# Patient Record
Sex: FEMALE | Race: BLACK OR AFRICAN AMERICAN | NOT HISPANIC OR LATINO | URBAN - METROPOLITAN AREA
[De-identification: names, ages, dates, MRNs, and addresses within clinical notes are randomized per-mention and may not be internally consistent; named-entity substitution may affect disease eponyms.]

---

## 2020-10-27 ENCOUNTER — OFFICE VISIT (OUTPATIENT)
Dept: URBAN - METROPOLITAN AREA SURGERY CENTER 18 | Facility: SURGERY CENTER | Age: 65
End: 2020-10-27
Payer: COMMERCIAL

## 2020-10-27 ENCOUNTER — CLAIMS CREATED FROM THE CLAIM WINDOW (OUTPATIENT)
Dept: URBAN - METROPOLITAN AREA CLINIC 4 | Facility: CLINIC | Age: 65
End: 2020-10-27
Payer: COMMERCIAL

## 2020-10-27 DIAGNOSIS — D12.3 ADENOMA OF TRANSVERSE COLON: ICD-10-CM

## 2020-10-27 DIAGNOSIS — Z86.010 H/O ADENOMATOUS POLYP OF COLON: ICD-10-CM

## 2020-10-27 DIAGNOSIS — D12.4 BENIGN NEOPLASM OF DESCENDING COLON: ICD-10-CM

## 2020-10-27 DIAGNOSIS — D12.3 BENIGN NEOPLASM OF TRANSVERSE COLON: ICD-10-CM

## 2020-10-27 DIAGNOSIS — K63.89 BACTERIAL OVERGROWTH SYNDROME: ICD-10-CM

## 2020-10-27 DIAGNOSIS — D12.4 ADENOMA OF DESCENDING COLON: ICD-10-CM

## 2020-10-27 PROCEDURE — 45380 COLONOSCOPY AND BIOPSY: CPT | Performed by: INTERNAL MEDICINE

## 2020-10-27 PROCEDURE — 88305 TISSUE EXAM BY PATHOLOGIST: CPT | Performed by: PATHOLOGY

## 2020-10-27 PROCEDURE — G9936 PMH PLYP/NEO CO/RECT/JUN/ANS: HCPCS | Performed by: INTERNAL MEDICINE

## 2020-10-27 PROCEDURE — 45385 COLONOSCOPY W/LESION REMOVAL: CPT | Performed by: INTERNAL MEDICINE

## 2020-10-27 PROCEDURE — G8907 PT DOC NO EVENTS ON DISCHARG: HCPCS | Performed by: INTERNAL MEDICINE

## 2020-10-27 RX ORDER — ERGOCALCIFEROL 1.25 MG/1
CAPSULE ORAL
Qty: 0 | Refills: 0 | Status: ACTIVE | COMMUNITY
Start: 1900-01-01 | End: 1900-01-01

## 2022-12-22 ENCOUNTER — OFFICE VISIT (OUTPATIENT)
Dept: URBAN - METROPOLITAN AREA CLINIC 98 | Facility: CLINIC | Age: 67
End: 2022-12-22
Payer: COMMERCIAL

## 2022-12-22 ENCOUNTER — LAB OUTSIDE AN ENCOUNTER (OUTPATIENT)
Dept: URBAN - METROPOLITAN AREA CLINIC 98 | Facility: CLINIC | Age: 67
End: 2022-12-22

## 2022-12-22 VITALS
SYSTOLIC BLOOD PRESSURE: 146 MMHG | WEIGHT: 219.6 LBS | TEMPERATURE: 97.8 F | BODY MASS INDEX: 38.91 KG/M2 | HEIGHT: 63 IN | HEART RATE: 87 BPM | DIASTOLIC BLOOD PRESSURE: 91 MMHG

## 2022-12-22 DIAGNOSIS — Z86.010 HISTORY OF COLONIC POLYPS: ICD-10-CM

## 2022-12-22 DIAGNOSIS — K59.00 CONSTIPATION, UNSPECIFIED CONSTIPATION TYPE: ICD-10-CM

## 2022-12-22 DIAGNOSIS — K57.90 DIVERTICULOSIS: ICD-10-CM

## 2022-12-22 PROCEDURE — 99213 OFFICE O/P EST LOW 20 MIN: CPT

## 2022-12-22 RX ORDER — ERGOCALCIFEROL 1.25 MG/1
CAPSULE ORAL
Qty: 0 | Refills: 0 | Status: ACTIVE | COMMUNITY
Start: 1900-01-01

## 2022-12-22 NOTE — HPI-TODAY'S VISIT:
Patient is a 67-year-old female who presents to discuss abdominal pain.   Last colonoscopy completed 10/27/2020 with Dr. Louie.  Findings included 2, 2 to 3 mm polyps at the hepatic flexure and in the ascending colon.  2, 4 to 5 mm polyps in the transverse colon.  1, 3 mm polyp in the descending colon.  Diverticulosis in the sigmoid colon and descending colon.  Examination was otherwise normal with nonbleeding internal hemorrhoids.   Pathology results revealed prominent mucosal lymphoid aggregates of the ascending colon polyp.  Prominent mucosal lymphoid aggregates at the hepatic flexure polyp.  Tubular adenoma in the transverse colon and descending colon.  Recommended repeat colonoscopy in 3 years She is now having lower abdominal pain- describes as an ache since 3/2022 Feels this daily  Did US and CT scan 11/22/2022 with PCP and told normal per patient  CT scan revealed no acute abdominopelvic process.  Colonic diverticulosis without acute diverticulitis.  Mild degenerative changes in the bones most prominently within the lower lumbar facet hypertrophy where there is minimal after listhesis of L4 and L5.  No bowel obstruction or abnormal bowel wall thickening.  No bladder, uterus, adnexa abnormalities. BMs are described alternating between constipation and normal Goes daily but can have straining intermittently  Denies BRBPR or melena  Denies unintentional weight loss  Denies fever or chills

## 2023-01-24 ENCOUNTER — DASHBOARD ENCOUNTERS (OUTPATIENT)
Age: 68
End: 2023-01-24

## 2023-01-24 ENCOUNTER — OFFICE VISIT (OUTPATIENT)
Dept: URBAN - METROPOLITAN AREA CLINIC 96 | Facility: CLINIC | Age: 68
End: 2023-01-24
Payer: COMMERCIAL

## 2023-01-24 VITALS
TEMPERATURE: 98.5 F | BODY MASS INDEX: 39.69 KG/M2 | WEIGHT: 224 LBS | HEIGHT: 63 IN | DIASTOLIC BLOOD PRESSURE: 90 MMHG | SYSTOLIC BLOOD PRESSURE: 137 MMHG | HEART RATE: 82 BPM

## 2023-01-24 DIAGNOSIS — K57.90 DIVERTICULOSIS: ICD-10-CM

## 2023-01-24 DIAGNOSIS — K59.00 CONSTIPATION, UNSPECIFIED CONSTIPATION TYPE: ICD-10-CM

## 2023-01-24 DIAGNOSIS — R10.30 LOWER ABDOMINAL PAIN: ICD-10-CM

## 2023-01-24 PROBLEM — 14760008: Status: ACTIVE | Noted: 2022-12-22

## 2023-01-24 PROBLEM — 397881000: Status: ACTIVE | Noted: 2022-12-22

## 2023-01-24 PROCEDURE — 99212 OFFICE O/P EST SF 10 MIN: CPT

## 2023-01-24 RX ORDER — ERGOCALCIFEROL 1.25 MG/1
CAPSULE ORAL
Qty: 0 | Refills: 0 | Status: ACTIVE | COMMUNITY
Start: 1900-01-01

## 2023-01-24 NOTE — HPI-TODAY'S VISIT:
Previously in 12/2022, Patient is a 67-year-old female who presents to discuss abdominal pain.   Last colonoscopy completed 10/27/2020 with Dr. Louie.  Findings included 2, 2 to 3 mm polyps at the hepatic flexure and in the ascending colon.  2, 4 to 5 mm polyps in the transverse colon.  1, 3 mm polyp in the descending colon.  Diverticulosis in the sigmoid colon and descending colon.  Examination was otherwise normal with nonbleeding internal hemorrhoids.   Pathology results revealed prominent mucosal lymphoid aggregates of the ascending colon polyp.  Prominent mucosal lymphoid aggregates at the hepatic flexure polyp.  Tubular adenoma in the transverse colon and descending colon.  Recommended repeat colonoscopy in 3 years She is now having lower abdominal pain- describes as an ache since 3/2022 Feels this daily  Did US and CT scan 11/22/2022 with PCP and told normal per patient  CT scan revealed no acute abdominopelvic process.  Colonic diverticulosis without acute diverticulitis.  Mild degenerative changes in the bones most prominently within the lower lumbar facet hypertrophy where there is minimal after listhesis of L4 and L5.  No bowel obstruction or abnormal bowel wall thickening.  No bladder, uterus, adnexa abnormalities. BMs are described alternating between constipation and normal Goes daily but can have straining intermittently  Denies BRBPR or melena  Denies unintentional weight loss  Denies fever or chills . Today on 1/24/2023, patient is set up for colonoscopy on 2/14/2022 She was advised to use fiber daily to ensure soft stools. She reports her BMs are "good"  She is having a BM daily- denies straining with BMs Denies BRBPR or melena  Reports she has not had significant lower abdominal pain since improved BMs

## 2023-01-26 PROBLEM — 428283002: Status: ACTIVE | Noted: 2022-12-22

## 2023-02-02 ENCOUNTER — OFFICE VISIT (OUTPATIENT)
Dept: URBAN - METROPOLITAN AREA CLINIC 98 | Facility: CLINIC | Age: 68
End: 2023-02-02

## 2023-02-14 ENCOUNTER — OFFICE VISIT (OUTPATIENT)
Dept: URBAN - METROPOLITAN AREA SURGERY CENTER 18 | Facility: SURGERY CENTER | Age: 68
End: 2023-02-14
Payer: COMMERCIAL

## 2023-02-14 DIAGNOSIS — D12.3 ADENOMA OF TRANSVERSE COLON: ICD-10-CM

## 2023-02-14 DIAGNOSIS — D12.8 ADENOMATOUS POLYP OF RECTUM: ICD-10-CM

## 2023-02-14 DIAGNOSIS — Z86.010 ADENOMAS PERSONAL HISTORY OF COLONIC POLYPS: ICD-10-CM

## 2023-02-14 PROCEDURE — G8907 PT DOC NO EVENTS ON DISCHARG: HCPCS | Performed by: INTERNAL MEDICINE

## 2023-02-14 PROCEDURE — 45385 COLONOSCOPY W/LESION REMOVAL: CPT | Performed by: INTERNAL MEDICINE

## 2023-02-14 PROCEDURE — 45380 COLONOSCOPY AND BIOPSY: CPT | Performed by: INTERNAL MEDICINE

## 2023-02-14 RX ORDER — ERGOCALCIFEROL 1.25 MG/1
CAPSULE ORAL
Qty: 0 | Refills: 0 | Status: ACTIVE | COMMUNITY
Start: 1900-01-01